# Patient Record
Sex: FEMALE | Race: WHITE | NOT HISPANIC OR LATINO | Employment: FULL TIME | URBAN - METROPOLITAN AREA
[De-identification: names, ages, dates, MRNs, and addresses within clinical notes are randomized per-mention and may not be internally consistent; named-entity substitution may affect disease eponyms.]

---

## 2022-10-03 ENCOUNTER — HOSPITAL ENCOUNTER (EMERGENCY)
Facility: CLINIC | Age: 22
Discharge: HOME | End: 2022-10-03
Attending: NURSE PRACTITIONER | Admitting: NURSE PRACTITIONER
Payer: COMMERCIAL

## 2022-10-03 VITALS
TEMPERATURE: 98.4 F | HEART RATE: 66 BPM | SYSTOLIC BLOOD PRESSURE: 112 MMHG | RESPIRATION RATE: 16 BRPM | DIASTOLIC BLOOD PRESSURE: 70 MMHG | OXYGEN SATURATION: 98 % | WEIGHT: 93 LBS

## 2022-10-03 DIAGNOSIS — J32.9 VIRAL SINUSITIS: ICD-10-CM

## 2022-10-03 DIAGNOSIS — B97.89 VIRAL SINUSITIS: ICD-10-CM

## 2022-10-03 PROCEDURE — G0463 HOSPITAL OUTPT CLINIC VISIT: HCPCS | Performed by: NURSE PRACTITIONER

## 2022-10-03 PROCEDURE — 99203 OFFICE O/P NEW LOW 30 MIN: CPT | Performed by: NURSE PRACTITIONER

## 2022-10-03 RX ORDER — LEVONORGESTREL AND ETHINYL ESTRADIOL 0.15-0.03
1 KIT ORAL DAILY
COMMUNITY
Start: 2022-09-11

## 2022-10-03 RX ORDER — FLUTICASONE PROPIONATE 50 MCG
2 SPRAY, SUSPENSION (ML) NASAL DAILY
Qty: 16 G | Refills: 0 | Status: SHIPPED | OUTPATIENT
Start: 2022-10-03

## 2022-10-03 RX ORDER — METHIMAZOLE 5 MG/1
2.5 TABLET ORAL DAILY
COMMUNITY
Start: 2022-09-07

## 2022-10-03 ASSESSMENT — ENCOUNTER SYMPTOMS
SINUS PRESSURE: 0
EYE REDNESS: 0
SINUS PAIN: 0
FATIGUE: 0
TROUBLE SWALLOWING: 0
SORE THROAT: 0
MYALGIAS: 0
ARTHRALGIAS: 0
VOMITING: 0
COUGH: 0
EYE DISCHARGE: 1
SHORTNESS OF BREATH: 0
FEVER: 0
CHILLS: 0
HEADACHES: 0
WEAKNESS: 0
JOINT SWELLING: 0
DIZZINESS: 0
RHINORRHEA: 1
NAUSEA: 0
NUMBNESS: 0
LIGHT-HEADEDNESS: 0
ABDOMINAL PAIN: 0

## 2022-10-03 ASSESSMENT — ACTIVITIES OF DAILY LIVING (ADL): ADLS_ACUITY_SCORE: 35

## 2022-10-04 NOTE — ED PROVIDER NOTES
History     Chief Complaint   Patient presents with     Sinusitis     Patient reporting sinus symptoms beginning Friday. Patient had negative Covid test at work today.     HPI  Paige Becker is a 22 year old female who presents to the urgent care for evaluation of nasal congestion, rhinorrhea and watery eyes for 3 days.  Takes daily cetirizine.  No known sick contacts. Denies fever, headache, dizziness, sinus pain, sore throat, cough, shortness of breath, chest pain, abdominal pain, nausea, vomiting, diarrhea, dysuria, hematuria and rash.      Allergies:  No Known Allergies    Problem List:    There are no problems to display for this patient.       Past Medical History:    Past Medical History:   Diagnosis Date     TUBERCULIN TEST REACTION NO TBC        Past Surgical History:    Past Surgical History:   Procedure Laterality Date     NO HISTORY OF SURGERY         Family History:    Family History   Adopted: Yes       Social History:  Marital Status:  Single [1]  Social History     Tobacco Use     Smoking status: Never Smoker   Substance Use Topics     Alcohol use: No     Drug use: No        Medications:    fluticasone (FLONASE) 50 MCG/ACT nasal spray  KURVELO 0.15-30 MG-MCG tablet  methimazole (TAPAZOLE) 5 MG tablet          Review of Systems   Constitutional: Negative for chills, fatigue and fever.   HENT: Positive for congestion and rhinorrhea. Negative for ear discharge, ear pain, sinus pressure, sinus pain, sore throat and trouble swallowing.    Eyes: Positive for discharge. Negative for redness.   Respiratory: Negative for cough and shortness of breath.    Cardiovascular: Negative for chest pain.   Gastrointestinal: Negative for abdominal pain, nausea and vomiting.   Musculoskeletal: Negative for arthralgias, joint swelling and myalgias.   Skin: Negative for rash.   Neurological: Negative for dizziness, weakness, light-headedness, numbness and headaches.   All other systems reviewed and are  negative.      Physical Exam   BP: 112/70  Pulse: 66  Temp: 98.4  F (36.9  C)  Resp: 16  Weight: 42.2 kg (93 lb)  SpO2: 98 %      Physical Exam  Constitutional:       General: She is not in acute distress.     Appearance: She is well-developed. She is not diaphoretic.   Eyes:      Conjunctiva/sclera: Conjunctivae normal.      Pupils: Pupils are equal, round, and reactive to light.   Cardiovascular:      Rate and Rhythm: Normal rate and regular rhythm.      Pulses: Normal pulses.   Pulmonary:      Effort: Pulmonary effort is normal. No respiratory distress.      Breath sounds: Normal breath sounds and air entry. No decreased air movement. No decreased breath sounds, wheezing or rhonchi.   Musculoskeletal:         General: Normal range of motion.      Cervical back: Normal range of motion and neck supple.   Skin:     General: Skin is warm.      Capillary Refill: Capillary refill takes less than 2 seconds.   Neurological:      General: No focal deficit present.      Mental Status: She is alert and oriented to person, place, and time.   Psychiatric:         Mood and Affect: Mood normal.         ED Course                 Procedures           No results found for this or any previous visit (from the past 24 hour(s)).    Medications - No data to display    Assessments & Plan (with Medical Decision Making)   Paige Becker is a 22 year old female who presents to the urgent care for evaluation of nasal congestion, rhinorrhea and watery eyes for 3 days. Vital signs normal, no worrisome findings on physical exam. Discussed likely viral etiology of symptoms and average course of viral illness. May use over the counter medications as needed and appropriate. Increase rest and hydration. Return precautions reviewed, all questions answered. Patient is agreeable to plan of care and discharged in good condition.    I have reviewed the nursing notes.    I have reviewed the findings, diagnosis, plan and need for follow up with the  patient.      Discharge Medication List as of 10/3/2022  7:04 PM      START taking these medications    Details   fluticasone (FLONASE) 50 MCG/ACT nasal spray Spray 2 sprays into both nostrils daily, Disp-16 g, R-0, E-Prescribe             Final diagnoses:   Viral sinusitis       10/3/2022   [facility] EMERGENCY DEPT     Diane Jamison, APRN CNP  10/03/22 1926